# Patient Record
Sex: FEMALE | Race: WHITE
[De-identification: names, ages, dates, MRNs, and addresses within clinical notes are randomized per-mention and may not be internally consistent; named-entity substitution may affect disease eponyms.]

---

## 2018-08-19 NOTE — RADIOLOGY REPORT (SQ)
EXAM DESCRIPTION: 



XR HAND 3 OR MORE VIEWS



COMPLETED DATE/TME:  08/19/2018 00:34



CLINICAL HISTORY: 



32 years, Female, eval foreign body, capture wrist as well



COMPARISON:

None.



NUMBER OF VIEWS:

Three



TECHNIQUE:

Three views of the left hand



LIMITATIONS:

None.



FINDINGS:



There is no acute fracture or dislocation. The joint spaces are

preserved. No radiopaque foreign body is identified. The soft

tissues are unremarkable



IMPRESSION:



No acute fracture or dislocation

 



 2011 [employer]- All Rights Reserved

## 2018-08-19 NOTE — RADIOLOGY REPORT (SQ)
EXAM DESCRIPTION:

XR WRIST 3 OR MORE VIEWS



COMPLETED DATE/TME:  08/19/2018 01:22



CLINICAL HISTORY:

32 years Female, fall, injury



COMPARISON:

None.



Findings:



Known soft tissue injury; no radioopaque foreign body.  Bones,

joints, and soft tissues of the XR WRIST 3 OR MORE VIEWS appear

otherwise intact. 



IMPRESSION:



Soft tissue injury; else, no acute findings.

.
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
## 2019-12-09 ENCOUNTER — HOSPITAL ENCOUNTER (EMERGENCY)
Dept: HOSPITAL 62 - ER | Age: 34
Discharge: HOME | End: 2019-12-09
Payer: MEDICAID

## 2019-12-09 VITALS — DIASTOLIC BLOOD PRESSURE: 79 MMHG | SYSTOLIC BLOOD PRESSURE: 135 MMHG

## 2019-12-09 DIAGNOSIS — Z88.8: ICD-10-CM

## 2019-12-09 DIAGNOSIS — H92.09: ICD-10-CM

## 2019-12-09 DIAGNOSIS — K02.9: Primary | ICD-10-CM

## 2019-12-09 DIAGNOSIS — K08.89: ICD-10-CM

## 2019-12-09 PROCEDURE — 99282 EMERGENCY DEPT VISIT SF MDM: CPT

## 2019-12-09 NOTE — ER DOCUMENT REPORT
ED Oral Problem





- General


Chief Complaint: Toothache


Stated Complaint: TOOTH PAIN, FACIAL PAIN


Time Seen by Provider: 12/09/19 18:43


Primary Care Provider: 


EMMANUEL NEVILLE MD [NO LOCAL MD] - Follow up as needed


Mode of Arrival: Ambulatory


Information source: Patient


Notes: 





33-year-old female presented to ED for complaint of pain to the right lower jaw.

 She states she had dental pain started 2 weeks ago.  She states the tooth broke

off 2 weeks ago and she went to see her dentist.  They placed her on penicillin 

and have given a refill on the penicillin told her she would need to be referred

to an oral surgeon for follow-up.  She states that she is having severe pain on 

the right side and she needs something for her pain.  She states the dentist did

not give her any pain medicine.  Patient is alert oriented respirations regular 

nonlabored speaking in full sentences.


TRAVEL OUTSIDE OF THE [place] IN LAST 30 DAYS: No





- HPI


Patient complains to provider of: Toothache


Onset: Other - 2 weeks


Onset: Gradual


Quality of pain: Sharp, Throbbing


Severity: Severe


Pain Level: 5


Associated symptoms: Toothache


Worsened by: Cold


Relieved by: Nothing


Similar symptoms previously: Yes


Recently seen / treated by doctor/dentist: Yes





- Related Data


Allergies/Adverse Reactions: 


                                        





ibuprofen Allergy (Verified 12/25/18 18:30)


   











Past Medical History





- General


Information source: Patient





- Social History


Smoking Status: Never Smoker


Frequency of alcohol use: None


Drug Abuse: None


Lives with: Family


Family History: Reviewed & Not Pertinent


Patient has suicidal ideation: No


Patient has homicidal ideation: No





- Past Medical History


Cardiac Medical History: Reports: None


Pulmonary Medical History: Reports: None


EENT Medical History: Reports: None


Neurological Medical History: Reports: Hx Migraine


Endocrine Medical History: Reports: None


Renal/ Medical History: Reports: None.  Denies: Hx Peritoneal Dialysis


Malignancy Medical History: Reports: None


GI Medical History: Reports: None


Musculoskeletal Medical History: Reports None


Skin Medical History: Reports None


Psychiatric Medical History: Reports: None


Traumatic Medical History: Reports: None


Infectious Medical History: Reports: None


Past Surgical History: Reports: Hx Hysterectomy





- Immunizations


Immunizations up to date: No


Hx Diphtheria, Pertussis, Tetanus Vaccination: Yes





Review of Systems





- Review of Systems


Constitutional: No symptoms reported


EENT: Ear pain, Mouth pain, Dental problem


Cardiovascular: No symptoms reported


Respiratory: No symptoms reported


Gastrointestinal: No symptoms reported


Genitourinary: No symptoms reported


Female Genitourinary: No symptoms reported


Musculoskeletal: No symptoms reported


Skin: No symptoms reported


Hematologic/Lymphatic: No symptoms reported


Neurological/Psychological: No symptoms reported





Physical Exam





- Vital signs


Vitals: 


                                        











Temp Pulse Resp BP Pulse Ox


 


 97.8 F   83   18   135/79 H  100 


 


 12/09/19 18:02  12/09/19 18:02  12/09/19 18:02  12/09/19 18:02  12/09/19 18:02











Interpretation: Normal





- General


General appearance: Appears well, Alert





- HEENT


Head: Normocephalic, Atraumatic


Eyes: Normal


Pupils: PERRL


Ears: Normal


External canal: Normal


Tympanic membrane: Normal


Sinus: Normal


Nasal: Normal


Mouth/Lips: Caries


Teeth diagram: 


                            __________________________














                            __________________________





 1 - Right cavity with no swelling to surrounding area.  She states the tooth 

broke off about a week ago and there is a large cavity there





Pharynx: Normal


Neck: Normal





- Respiratory


Respiratory status: No respiratory distress


Chest status: Nontender


Breath sounds: Normal


Chest palpation: Normal





- Cardiovascular


Rhythm: Regular


Heart sounds: Normal auscultation


Murmur: No





- Abdominal


Inspection: Normal


Distension: No distension


Bowel sounds: Normal


Tenderness: Nontender


Organomegaly: No organomegaly





- Back


Back: Normal, Nontender





- Extremities


General upper extremity: Normal inspection, Nontender, Normal color, Normal ROM,

Normal temperature


General lower extremity: Normal inspection, Nontender, Normal color, Normal ROM,

Normal temperature, Normal weight bearing.  No: Allie's sign





- Neurological


Neuro grossly intact: Yes


Cognition: Normal


Orientation: AAOx4


[place]bridge Coma Scale Eye Opening: Spontaneous


[place] Coma Scale Verbal: Oriented


[place]atha Coma Scale Motor: Obeys Commands


[place] Coma Scale Total: 15


Speech: Normal


Motor strength normal: LUE, RUE, LLE, RLE


Sensory: Normal





- Psychological


Associated symptoms: Normal affect, Normal mood





- Skin


Skin Temperature: Warm


Skin Moisture: Dry


Skin Color: Normal





Course





- Re-evaluation


Re-evalutation: 





12/09/19 21:44


Presentation is most consistent with likely an infected tooth.  Airway is 

patent.  Vitals within normal limits.  Patient is able swallow without any 

difficulty.  There is no significant facial swelling.  No evidence of Abdi 

angina, apical abscess, or airway obstruction.  Patient will be started on 

antibiotics.  I've instructed to follow-up with dentistry as earliest ability 

for definitive management.  At this time will discharge with return precautions 

and follow-up recommendations.  Verbal discharge instructions given a the be

dside and opportunity for questions given. Medication warnings reviewed. Patient

is in agreement with this plan and has verbalized understanding of return 

precautions and the need for primary care follow-up in the next 24-72 hours.


Patient was given 1 [place] in the emergency room.  She was told to use her 

Tylenol at home.  She was also given a 50 cc syringe full of viscous lidocaine 

and told to use this lidocaine small amount to her finger every 3-4 hours as 

needed for pain.  Patient was told not to use it more often than every 3-4 

hours.  Patient verbalized understanding and agreement with treatment plan.  

Patient was told to call the dentist to find out when she was going to be able 

to see the oral surgeon first thing in the morning.  Patient verbalized 

understanding and agreement with treatment plan before being discharged home.








- Vital Signs


Vital signs: 


                                        











Temp Pulse Resp BP Pulse Ox


 


 97.8 F   83   18   135/79 H  100 


 


 12/09/19 18:43  12/09/19 18:02  12/09/19 18:43  12/09/19 18:02  12/09/19 18:43














Discharge





- Discharge


Clinical Impression: 


 Pain due to dental caries





Condition: Stable


Disposition: HOME, SELF-CARE


Additional Instructions: 


TOOTHACHE:





     Your pain is due to dental decay.  The tooth must be repaired in order for 

you to feel better.  You will, therefore, be referred to a dentist.  We do not 

have dentists on the staff at [facility].


     Severe swelling or drainage around a tooth usually means a dental abscess. 

This also requires evaluation and treatment by the dentist, but antibiotics may 

be prescribed while awaiting dental treatment.


     You should be rechecked immediately if you develop major swelling of the 

face, increasing pain, a lump in the jaw or gums, headache, difficulty 

swallowing, or fever.








ORAL NARCOTIC MEDICATION:


     You have been given a Norco for pain control.  This medication is a 

narcotic.  It's best taken with food, as nausea can result if taken on an empty 

stomach.


     Don't operate machinery or drive within six hours of taking this 

medication.  Do not combine this medicine with alcohol, or with any medication 

which can cause sedation (such as cold tablets or sleeping pills) unless you get

permission from the physician.


     Narcotics tend to cause constipation.  If possible, drink plenty of fluids 

and eat a diet high in fiber and fruits.





   Acetaminophen





     Acetaminophen may be taken for pain relief or fever control. It's much 

safer than aspirin, offering a wider range of "safe" dosages.  It is safe during

pregnancy.  Some brand names are Tylenol, Panadol, Datril, Anacin 3, Tempra, and

Liquiprin. Acetaminophen can be repeated every four hours.  The following are 

maximum recommended dosages:





WEIGHT         Dose             Drops                  Elixir        

Chewable(80mg)


(LBS.)                            drprs=droppers    tsp=teaspoon


6                 40 mg            .4 ml (1/2)


6-11            80 mg            .8 ml (full)            1/2   tsp           1  

    tab


12-16         120 mg           1 1/2 drprs            3/4   tsp           1 1/2 

tabs


17-23         160 mg             2  drprs              1      tsp           2   

   tabs


24-30         240 mg             3  drprs              1 1/2 tsp           3    

  tabs


30-35         320 mg                                     2       tsp           4

      tabs


36-41         360 mg                                     2 1/4 tsp           4 

1/2  tabs


42-47         400 mg                                     2 1/2 tsp           5  

    tabs


48-53         480 mg                                     3       tsp          6 

     tabs


54-59         520 mg                                     3  1/4 tsp          6 

1/2 tabs


60-64         560 mg                                     3  1/2 tsp          7  

   tabs 


65-70         600 mg                                     3  3/4 tsp          7 

1/2 tabs


71-76         640 mg                                     4       tsp           8

     tabs


77-82         720 mg                                     4 1/2  tsp           9 

    tabs


83-88         800 mg                                     5       tsp           

10     tabs





>89 pounds or adults          650 mg to 900 mg 





Acetaminophen can be repeated every four hours. Maximum daily dose not to exceed

4000 mg.





   These maximum recommended dosages are slightly higher than the dosages 

written on the product container, but these dosages are very safe and well below

the toxic dosage for acetaminophen.








FOLLOW-UP CARE:


     You have been referred for follow-up care to the dentists listed below.  

Call the dentists office for an appointment as you were instructed or within 

the next two days.  If you experience worsening or a significant change in your 

symptoms, notify the physician immediately or return to the Emergency Department

at any time for re-evaluation.








[facility]


[address] [place] [address] [phone]





[facility]ly [facility]


[address] [facility]


[phone]





[facility]


[address]


[facility]


[phone]





[facility]


[address] [facility]


[phone]


[email or web address]


[address] [place]  [address] [phone]


Monday-Thursdays  8:00am to 5:00 pm


Will see patients from other [facility].


Charges based on income and family size and


accepts Medicare, Medicaid, and Insurances


Will pull molars





[facility]


[address] [facility] [address] [phone]


Hours of Operation


   8:00 am - 4:30 pm weekdays





The following dental offices accept Medicaid:





   [facility]   [phone]


   Dr. Art         [phone]


   Dr. Thompson         [phone]


   Dr. Rodriguez         [phone]


   Dr. Bedoya         [phone]


   [facility]


      oral surgery      [phone]


   Dr. Person ([place])      [phone]


   Dr. Christie ([place])   [phone]


   [facility]      [phone]


   [facility] ([place])   [phone]


   Dr. Gilbert ([place])      [phone]


   [facility]      [phone]


   [facility]   [phone]


   [facility]   [phone]


   Dr. Turner ([place])      [phone]


   [facility] 


      ([place])      [phone]





   Medicaid Care Line      [phone]


Forms:  Elevated Blood Pressure


Referrals: 


EMMANUEL NEVILLE MD [NO LOCAL MD] - Follow up as needed

## 2020-04-23 ENCOUNTER — HOSPITAL ENCOUNTER (EMERGENCY)
Dept: HOSPITAL 62 - ER | Age: 35
Discharge: LEFT BEFORE DISCHARGE | End: 2020-04-23
Payer: COMMERCIAL

## 2020-04-23 VITALS — SYSTOLIC BLOOD PRESSURE: 121 MMHG | DIASTOLIC BLOOD PRESSURE: 72 MMHG
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
## 2020-04-23 DIAGNOSIS — F12.10: ICD-10-CM

## 2020-04-23 DIAGNOSIS — M54.41: ICD-10-CM

## 2020-04-23 DIAGNOSIS — M54.2: Primary | ICD-10-CM

## 2020-04-23 DIAGNOSIS — G89.29: ICD-10-CM

## 2020-04-23 DIAGNOSIS — F17.210: ICD-10-CM

## 2020-04-23 DIAGNOSIS — M54.42: ICD-10-CM

## 2020-04-23 DIAGNOSIS — M25.551: ICD-10-CM

## 2020-04-23 DIAGNOSIS — M25.561: ICD-10-CM

## 2020-04-23 DIAGNOSIS — Z71.6: ICD-10-CM

## 2020-04-23 LAB
APPEARANCE UR: (no result)
APTT PPP: YELLOW S
BARBITURATES UR QL SCN: NEGATIVE
BILIRUB UR QL STRIP: NEGATIVE
GLUCOSE UR STRIP-MCNC: NEGATIVE MG/DL
KETONES UR STRIP-MCNC: NEGATIVE MG/DL
METHADONE UR QL SCN: NEGATIVE
PCP UR QL SCN: NEGATIVE
PH UR STRIP: 5 [PH] (ref 5–9)
PROT UR STRIP-MCNC: 30 MG/DL
SP GR UR STRIP: 1.02
URINE AMPHETAMINES SCREEN: NEGATIVE
URINE BENZODIAZEPINES SCREEN: (no result)
URINE COCAINE SCREEN: NEGATIVE
URINE MARIJUANA (THC) SCREEN: (no result)
UROBILINOGEN UR-MCNC: NEGATIVE MG/DL (ref ?–2)

## 2020-04-23 PROCEDURE — 81025 URINE PREGNANCY TEST: CPT

## 2020-04-23 PROCEDURE — 99281 EMR DPT VST MAYX REQ PHY/QHP: CPT

## 2020-04-23 PROCEDURE — 80307 DRUG TEST PRSMV CHEM ANLYZR: CPT

## 2020-04-23 PROCEDURE — 81001 URINALYSIS AUTO W/SCOPE: CPT
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
## 2025-08-02 NOTE — ER DOCUMENT REPORT
-DEXA scan order placed  -Continue current medication: Alendronate 70mg once weekly   ED General





- General


Chief Complaint: Flu Symptoms


Stated Complaint: FEVER/VOMITING


Notes: 


Patient is a 31 year old female with a past medical history of migraine 

headaches who presents with concerns of 2 days of a bitemporal, throbbing, 

severe headache.  Patient does state that the headache gradually started has 

gotten progressively worse since that time.  States this feels similar to prior 

migraine headaches that she's had in the past. However, she also notes she has 

had a productive cough with associated vomiting.  Please she's had a subjective 

fever at home.  Nothing improves or worsens or symptoms.  She has not seen her 

primary care doctor regarding today's concerns.


TRAVEL OUTSIDE OF THE [place] IN LAST 30 DAYS: No





- Related Data


Allergies/Adverse Reactions: 


 





No Known Allergies Allergy (Unverified 07/15/14 17:51)


 











Past Medical History





- General


Information source: Patient





- Social History


Smoking Status: Never Smoker


Frequency of alcohol use: None


Drug Abuse: None


Lives with: Spouse/Significant other


Family History: Reviewed & Not Pertinent


Patient has suicidal ideation: No


Patient has homicidal ideation: No


Neurological Medical History: Reports: Hx Migraine


Renal/ Medical History: Denies: Hx Peritoneal Dialysis





- Immunizations


Hx Diphtheria, Pertussis, Tetanus Vaccination: No





Review of Systems





- Review of Systems


Notes: 


Constitutional: Positive for fever.


HENT: Negative for sore throat.


Eyes: Negative for visual changes.


Cardiovascular: Negative for chest pain.


Respiratory: Negative for shortness of breath.


Gastrointestinal: Negative for abdominal pain, positive for vomiting


Genitourinary: Negative for dysuria.


Musculoskeletal: Negative for back pain.


Skin: Negative for rash.


Neurological: Positive for headaches, negative for weakness or numbness.





10 point ROS negative except as marked above and in HPI.





Physical Exam





- Vital signs


Vitals: 


 











Temp Pulse Resp BP Pulse Ox


 


 97.5 F   67   22 H  130/69 H  100 


 


 04/14/17 21:16  04/14/17 21:16  04/14/17 21:16  04/14/17 21:16  04/14/17 21:16











Interpretation: Tachypneic


Notes: 


PHYSICAL EXAMINATION:





GENERAL: Appears mild and comfortable in no acute distress





HEAD: Atraumatic, normocephalic.





EYES: Pupils equal round and reactive to light, extraocular movements intact, 

sclera anicteric, conjunctiva are normal.





ENT: nares patent, oropharynx clear without exudates.  Dry mucous membranes.





NECK: Normal range of motion, supple without lymphadenopathy





LUNGS: Breath sounds clear to auscultation bilaterally and equal.  No wheezes 

rales or rhonchi.





HEART: Regular rate and rhythm without murmurs





ABDOMEN: Soft, nontender, normoactive bowel sounds.  No guarding, no rebound.  

No masses appreciated.





EXTREMITIES: Normal range of motion, no pitting or edema.  No cyanosis.





NEUROLOGICAL: Face symmetric.  Tongue protrudes midline.  Extraocular motions 

intact.  Pupils are 2 mm and equally reactive.  Normal speech, normal gait.  5 

out of 5 strength in both the distal and proximal upper and lower extremities 

bilaterally.  Sensation is grossly intact throughout.  Finger to nose testing 

normal.  Pronator drift normal.





PSYCH: Normal mood, normal affect.





SKIN: Warm, Dry, normal turgor, no rashes or lesions noted.





Course





- Re-evaluation


Re-evalutation: 


04/14/17 23:09


Patient presents with headache, vomiting, cough, and generalized body aches.  

She has no nuchal rigidity on exam.  Vitals within normal limits at time of 

assessment.  Able to flex her neck to her chest without any difficulty.  

Mentation is normal.  No focal abdominal tenderness.  She does have a dry cough 

on bedside exam.  Obvious photophobia.  Her main complaint is headache. appears 

to be most consistent with tension versus migrainous type headache.  Headache 

was not maximal in onset, patient has no focal neurologic deficits, no nuchal 

rigidity, vital signs within normal limits, no papilledema, and patient is 

overall well in appearance.  Based on clinical history and examination I do not 

suspect an acute subarachnoid hemorrhage, dural venous sinus thrombosis, acute 

meningitis, or intercranial mass.  Given my low clinical suspicion for any 

acute life-threatening etiology, I do not feel advanced neuro imaging  is 

indicated at this time.  Influenza testing is negative.  Labs are otherwise 

unremarkable with the exception of a mildly leukocytosis which is nonspecific.  

Will symptomatically treat, obtain x-ray to exclude acute pneumonia for which I 

overall low clinical and sick suspicion and reassess.  Patient's most likely 

having these symptoms secondary to a viral infection.








0002-chest x-ray consistent with a right lower lobe pneumonia.  Patient will be 

started on doxycycline.  Headache has improved after migraine cocktail.At this 

time will discharge with return precautions and follow-up recommendations.  

Verbal discharge instructions given a the bedside and opportunity for questions 

given. Medication warnings reviewed. Patient is in agreement with this plan and 

has verbalized understanding of return precautions and the need for primary 

care follow-up in the next 24-72 hours.











- Vital Signs


Vital signs: 


 











Temp Pulse Resp BP Pulse Ox


 


 97.5 F   67   22 H  130/69 H  100 


 


 04/14/17 21:16  04/14/17 21:16  04/14/17 21:16  04/14/17 21:16  04/14/17 21:16














- Laboratory


Result Diagrams: 


 04/14/17 20:15





 04/14/17 20:15


Laboratory results interpreted by me: 


 











  04/14/17 04/14/17





  20:15 20:15


 


WBC  15.1 H 


 


Plt Count  471 H 


 


Seg Neutrophils %  79.0 H 


 


Absolute Neutrophils  12.0 H 


 


Urine Ketones   TRACE H


 


Urine Blood   SMALL H














- Diagnostic Test


Radiology reviewed: Image reviewed, Reports reviewed


Radiology results interpreted by me: 





04/15/17 00:03


Chest x-ray: Patchy right lower lobe infiltrate





Discharge





- Discharge


Clinical Impression: 


Right lower lobe pneumonia


Qualifiers:


 Pneumonia type: due to unspecified organism Qualified Code(s): J18.1 - Lobar 

pneumonia, unspecified organism





Condition: Good


Disposition: HOME, SELF-CARE


Additional Instructions: 


You have been diagnosed with a pneumonia.  It is very important that you take 

all of your antibiotics until they are gone even if you are feeling better.  

Please return to the emergency department immediately if you began having 

worsening shortness of breath, become confused, have worsening pain, pass out, 

have persistent vomiting that prevents you from being able to drink fluids for 

more than 12 hours, or have any other symptoms that are worrisome to you.  

Please follow-up with your primary care doctor in the next 1-2 days.


Prescriptions: 


Doxycycline Hyclate 100 mg PO BID #14 capsule


Referrals: 


EMMANUEL NEVILLE MD [Primary Care Provider] - Follow up in 3-5 days